# Patient Record
Sex: MALE | Race: WHITE | NOT HISPANIC OR LATINO | Employment: FULL TIME | URBAN - METROPOLITAN AREA
[De-identification: names, ages, dates, MRNs, and addresses within clinical notes are randomized per-mention and may not be internally consistent; named-entity substitution may affect disease eponyms.]

---

## 2021-06-11 ENCOUNTER — OFFICE VISIT (OUTPATIENT)
Dept: URGENT CARE | Facility: CLINIC | Age: 61
End: 2021-06-11
Payer: COMMERCIAL

## 2021-06-11 VITALS
TEMPERATURE: 98.2 F | SYSTOLIC BLOOD PRESSURE: 134 MMHG | HEART RATE: 66 BPM | OXYGEN SATURATION: 98 % | DIASTOLIC BLOOD PRESSURE: 76 MMHG

## 2021-06-11 DIAGNOSIS — L03.011 PARONYCHIA OF FINGER OF RIGHT HAND: ICD-10-CM

## 2021-06-11 PROBLEM — N40.0 BENIGN PROSTATIC HYPERPLASIA WITHOUT LOWER URINARY TRACT SYMPTOMS: Status: ACTIVE | Noted: 2020-09-15

## 2021-06-11 PROBLEM — I10 BENIGN ESSENTIAL HTN: Status: ACTIVE | Noted: 2020-09-15

## 2021-06-11 PROCEDURE — 99203 OFFICE O/P NEW LOW 30 MIN: CPT | Performed by: PHYSICIAN ASSISTANT

## 2021-06-11 RX ORDER — FINASTERIDE 5 MG/1
5 TABLET, FILM COATED ORAL
COMMUNITY
Start: 2021-05-22

## 2021-06-11 RX ORDER — COVID-19 TEST SPECIMEN COLLECT
MISCELLANEOUS MISCELLANEOUS
COMMUNITY
Start: 2021-05-15

## 2021-06-11 RX ORDER — LOSARTAN POTASSIUM 50 MG/1
100 TABLET ORAL
COMMUNITY
Start: 2021-05-22

## 2021-06-11 RX ORDER — SULFAMETHOXAZOLE AND TRIMETHOPRIM 800; 160 MG/1; MG/1
1 TABLET ORAL 2 TIMES DAILY
Qty: 10 TABLET | Refills: 0 | Status: SHIPPED | OUTPATIENT
Start: 2021-06-11 | End: 2021-06-16

## 2021-06-11 ASSESSMENT — ENCOUNTER SYMPTOMS
FOCAL WEAKNESS: 0
SENSORY CHANGE: 0
TINGLING: 0
CHILLS: 0
FEVER: 0

## 2021-06-12 NOTE — PROGRESS NOTES
Subjective:   Prosper Lorenzana is a 60 y.o. male who presents for Finger Pain ((R) ring finger infection, swelling and redness x 5 days)      HPI  Patient presents to clinic with complaints of a possible infection to his right ring finger around his nail.  Symptoms started 5 days ago with redness and swelling.  He had pulled his cuticle proximally and expressed some yellow drainage.  He has mild to minimal pain to the area.  Improvement of symptoms today.  No numbness tingling or weakness.  No fevers or chills.      Review of Systems   Constitutional: Negative for chills and fever.   Skin:        Right ring finger infection around the nail.   Neurological: Negative for tingling, sensory change and focal weakness.       Medications:    • finasteride Tabs  • losartan Tabs    Allergies: Patient has no known allergies.    Problem List: Prosper Lorenzana does not have a problem list on file.    Surgical History:  No past surgical history on file.    Past Social Hx: Prosper Lorenzana       Past Family Hx:  Prosper Lorenzana family history is not on file.     Problem list, medications, and allergies reviewed by myself today in Epic.     Objective:     /76 (BP Location: Left arm, Patient Position: Sitting, BP Cuff Size: Adult)   Pulse 66   Temp 36.8 °C (98.2 °F) (Temporal)   SpO2 98%     Physical Exam  Vitals reviewed.   Constitutional:       General: He is not in acute distress.     Appearance: Normal appearance. He is not ill-appearing or toxic-appearing.   Eyes:      Conjunctiva/sclera: Conjunctivae normal.      Pupils: Pupils are equal, round, and reactive to light.   Cardiovascular:      Rate and Rhythm: Normal rate.   Pulmonary:      Effort: Pulmonary effort is normal.   Musculoskeletal:        Hands:       Cervical back: Neck supple.      Comments: Right 4th finger:   There is mild erythema and edema to proximal nail fold consistent with paronychia. No felon.   No fluctuance or current drainage.   No streaking.    Minimal to no TTP.   Nail intact.   Neurovasc intact distally.   Full ROM.      Skin:     General: Skin is warm and dry.   Neurological:      General: No focal deficit present.      Mental Status: He is alert and oriented to person, place, and time.   Psychiatric:         Mood and Affect: Mood normal.         Behavior: Behavior normal.         Assessment/Associated Orders     1. Paronychia of finger of right hand  sulfamethoxazole-trimethoprim (BACTRIM DS) 800-160 MG tablet       Medical Decision Making      No indication for I&D. Per patient he manually expressed purulent yellow material with relief of symptoms.   Treatment as above.   Warm compresses and massage with clean rag.   Warm water epsom salt soaks.   Ibuprofen for inflammation and pain.     I personally reviewed prior external notes and test results pertinent to today's visit.  Supportive care, natural history, differential diagnoses, and indications for immediate follow-up discussed. Patient expresses understanding and agrees to plan. Patient denies any other questions or concerns.     Advised the patient to follow-up with the primary care physician for recheck, reevaluation, and consideration of further management.    Please note that this dictation was created using voice recognition software. I have made a reasonable attempt to correct obvious errors, but I expect that there are errors of grammar and possibly content that I did not discover before finalizing the note.    This note was electronically signed by Wil De La Rosa PA-C